# Patient Record
Sex: MALE | Race: WHITE | Employment: FULL TIME | URBAN - METROPOLITAN AREA
[De-identification: names, ages, dates, MRNs, and addresses within clinical notes are randomized per-mention and may not be internally consistent; named-entity substitution may affect disease eponyms.]

---

## 2024-11-18 ENCOUNTER — OFFICE VISIT (OUTPATIENT)
Dept: SURGERY | Age: 54
End: 2024-11-18
Payer: COMMERCIAL

## 2024-11-18 VITALS
DIASTOLIC BLOOD PRESSURE: 110 MMHG | SYSTOLIC BLOOD PRESSURE: 176 MMHG | HEART RATE: 94 BPM | BODY MASS INDEX: 44.1 KG/M2 | HEIGHT: 71 IN | WEIGHT: 315 LBS

## 2024-11-18 DIAGNOSIS — K59.1 FUNCTIONAL DIARRHEA: ICD-10-CM

## 2024-11-18 DIAGNOSIS — Z71.82 EXERCISE COUNSELING: ICD-10-CM

## 2024-11-18 DIAGNOSIS — E66.01 MORBID OBESITY: Primary | ICD-10-CM

## 2024-11-18 DIAGNOSIS — Z71.3 DIETARY COUNSELING: ICD-10-CM

## 2024-11-18 DIAGNOSIS — R11.0 NAUSEA: ICD-10-CM

## 2024-11-18 DIAGNOSIS — E66.01 OBESITY, MORBID, BMI 50 OR HIGHER: ICD-10-CM

## 2024-11-18 DIAGNOSIS — Z98.84 S/P LAPAROSCOPIC SLEEVE GASTRECTOMY: ICD-10-CM

## 2024-11-18 DIAGNOSIS — Z13.21 ENCOUNTER FOR VITAMIN DEFICIENCY SCREENING: ICD-10-CM

## 2024-11-18 DIAGNOSIS — K59.01 SLOW TRANSIT CONSTIPATION: ICD-10-CM

## 2024-11-18 PROCEDURE — 99203 OFFICE O/P NEW LOW 30 MIN: CPT | Performed by: SURGERY

## 2024-11-18 PROCEDURE — APPSS30 APP SPLIT SHARED TIME 16-30 MINUTES: Performed by: PHYSICIAN ASSISTANT

## 2024-11-18 RX ORDER — OXYBUTYNIN CHLORIDE 5 MG/1
5 TABLET, EXTENDED RELEASE ORAL DAILY
COMMUNITY

## 2024-11-18 NOTE — PROGRESS NOTES
[facility] NUTRITION REASSESSMENT  Assessment:   Almost 13 years post-op VSG. Pt consuming ~60g protein/d and ~90oz fluid/d. Pt is eating at least 3x/d. Pt is drinking water and gatorade zero. Pt is not exercising. Pt is taking MVI and Ca supplements as recommended.    Diet Recall:   Breakfast: Cereal   Lunch: Meat and cheese   Dinner: Meat and chips     Intervention:   Evaluated diet recall and identified modifications.  Encouraged pt to go \"back to basics: and focus on lean protein and mindful eating - provided pt with paperwork  Encouraged pt to begin exercising and increase as able        Monitoring and Evaluation:  Monitor for continued safe, supervised weight loss for VSG.   Will place pt on OM wait list  F/U per MD Neela Parkinson MBA. RD, LD   
medical history.  Patient Active Problem List   Diagnosis    PERLA on CPAP    Morbid obesity        PSH:    History reviewed. No pertinent surgical history.    MEDS:    Current Outpatient Medications   Medication Sig Dispense Refill    oxyBUTYnin (DITROPAN-XL) 5 MG extended release tablet Take 1 tablet by mouth daily       No current facility-administered medications for this visit.       ALLERGIES:      Not on File    SH:    Social History     Tobacco Use    Smoking status: Never    Smokeless tobacco: Never       FH:    History reviewed. No pertinent family history.    Review of systems:  The patient has no difficulty with chest pain or shortness of breath.  No fever or chills.  Comprehensive 13 point review of systems was otherwise unremarkable except as noted above.    Physical Exam:  BP (!) 176/110   Pulse 94   Ht 1.803 m (5' 11\")   Wt (!) 173.3 kg (382 lb)   BMI 53.28 kg/m²     General: Alert, oriented, cooperative white male in no acute distress.   Eyes: Sclera are clear. Conjunctiva and lids within normal limits. No icterus.  Ears and Nose: no gross deformities to visual inspection, gross hearing intact  Neck: Supple, trachea midline, no appreciable thyromegaly  Resp:  Breathing is  non-labored. Lungs clear to auscultation without wheezing or rhonchi   CV: RRR. No murmurs, rubs or gallops appreciated.  Abd: soft, not distended, active BS'S. No tenderness to palpation. Wounds are clean, dry and intact without signs of infection.     Discussed the patient's BMI with him.  The BMI follow up plan is as follows:    Plan:  He is doing well without any major concerns.  He will continue a protein first diet, all questions were answered.  He will increase the current exercise routine.  He will continue the appropriate vitamins and supplements.  Complete labs for monitoring.   Medical weight loss paperwork given.   Return to the office in 1 year or sooner.     Time: I spent 20 minutes preparing to see patient

## 2024-11-22 DIAGNOSIS — Z13.21 ENCOUNTER FOR VITAMIN DEFICIENCY SCREENING: ICD-10-CM

## 2024-11-22 DIAGNOSIS — E66.01 MORBID OBESITY: ICD-10-CM

## 2024-11-22 DIAGNOSIS — Z98.84 S/P LAPAROSCOPIC SLEEVE GASTRECTOMY: ICD-10-CM

## 2024-11-22 LAB
25(OH)D3 SERPL-MCNC: 30.4 NG/ML (ref 30–100)
ALBUMIN SERPL-MCNC: 3.5 G/DL (ref 3.5–5)
ALBUMIN/GLOB SERPL: 1 (ref 1–1.9)
ALP SERPL-CCNC: 79 U/L (ref 40–129)
ALT SERPL-CCNC: 21 U/L (ref 8–55)
ANION GAP SERPL CALC-SCNC: 11 MMOL/L (ref 7–16)
AST SERPL-CCNC: 22 U/L (ref 15–37)
BASOPHILS # BLD: 0.1 K/UL (ref 0–0.2)
BASOPHILS NFR BLD: 1 % (ref 0–2)
BILIRUB SERPL-MCNC: 0.3 MG/DL (ref 0–1.2)
BUN SERPL-MCNC: 21 MG/DL (ref 6–23)
CALCIUM SERPL-MCNC: 9.6 MG/DL (ref 8.8–10.2)
CHLORIDE SERPL-SCNC: 103 MMOL/L (ref 98–107)
CO2 SERPL-SCNC: 24 MMOL/L (ref 20–29)
CREAT SERPL-MCNC: 1.17 MG/DL (ref 0.8–1.3)
DIFFERENTIAL METHOD BLD: ABNORMAL
EOSINOPHIL # BLD: 0.1 K/UL (ref 0–0.8)
EOSINOPHIL NFR BLD: 1 % (ref 0.5–7.8)
ERYTHROCYTE [DISTWIDTH] IN BLOOD BY AUTOMATED COUNT: 15.7 % (ref 11.9–14.6)
FERRITIN SERPL-MCNC: 32 NG/ML (ref 8–388)
FOLATE SERPL-MCNC: 11.6 NG/ML (ref 3.1–17.5)
GLOBULIN SER CALC-MCNC: 3.5 G/DL (ref 2.3–3.5)
GLUCOSE SERPL-MCNC: 91 MG/DL (ref 70–99)
HCT VFR BLD AUTO: 47.2 % (ref 41.1–50.3)
HGB BLD-MCNC: 15.2 G/DL (ref 13.6–17.2)
IMM GRANULOCYTES # BLD AUTO: 0.1 K/UL (ref 0–0.5)
IMM GRANULOCYTES NFR BLD AUTO: 1 % (ref 0–5)
IRON SERPL-MCNC: 77 UG/DL (ref 35–100)
LYMPHOCYTES # BLD: 2.2 K/UL (ref 0.5–4.6)
LYMPHOCYTES NFR BLD: 27 % (ref 13–44)
MAGNESIUM SERPL-MCNC: 2.1 MG/DL (ref 1.8–2.4)
MCH RBC QN AUTO: 27.8 PG (ref 26.1–32.9)
MCHC RBC AUTO-ENTMCNC: 32.2 G/DL (ref 31.4–35)
MCV RBC AUTO: 86.4 FL (ref 82–102)
MONOCYTES # BLD: 0.6 K/UL (ref 0.1–1.3)
MONOCYTES NFR BLD: 8 % (ref 4–12)
NEUTS SEG # BLD: 5 K/UL (ref 1.7–8.2)
NEUTS SEG NFR BLD: 62 % (ref 43–78)
NRBC # BLD: 0 K/UL (ref 0–0.2)
PLATELET # BLD AUTO: 295 K/UL (ref 150–450)
PMV BLD AUTO: 9.8 FL (ref 9.4–12.3)
POTASSIUM SERPL-SCNC: 4.5 MMOL/L (ref 3.5–5.1)
PROT SERPL-MCNC: 6.9 G/DL (ref 6.3–8.2)
RBC # BLD AUTO: 5.46 M/UL (ref 4.23–5.6)
SODIUM SERPL-SCNC: 138 MMOL/L (ref 136–145)
TSH, 3RD GENERATION: 0.95 UIU/ML (ref 0.27–4.2)
VIT B12 SERPL-MCNC: 561 PG/ML (ref 193–986)
WBC # BLD AUTO: 7.9 K/UL (ref 4.3–11.1)

## 2024-11-26 LAB
VIT B1 BLD-SCNC: 131.1 NMOL/L (ref 66.5–200)
VIT B6 SERPL-MCNC: 45.7 UG/L (ref 3.4–65.2)

## 2024-11-27 LAB
NIACIN SERPL-MCNC: <5 NG/ML (ref 0–5)
NICOTINAMIDE SERPL-MCNC: 27.5 NG/ML (ref 5.2–72.1)

## 2025-03-31 ENCOUNTER — OFFICE VISIT (OUTPATIENT)
Dept: SURGERY | Age: 55
End: 2025-03-31
Payer: COMMERCIAL

## 2025-03-31 VITALS
HEIGHT: 71 IN | SYSTOLIC BLOOD PRESSURE: 168 MMHG | HEART RATE: 110 BPM | BODY MASS INDEX: 44.1 KG/M2 | DIASTOLIC BLOOD PRESSURE: 104 MMHG | WEIGHT: 315 LBS

## 2025-03-31 DIAGNOSIS — Z71.3 NUTRITIONAL COUNSELING: ICD-10-CM

## 2025-03-31 DIAGNOSIS — Z98.84 S/P LAPAROSCOPIC SLEEVE GASTRECTOMY: ICD-10-CM

## 2025-03-31 DIAGNOSIS — N32.81 OVERACTIVE BLADDER: ICD-10-CM

## 2025-03-31 DIAGNOSIS — E66.01 CLASS 3 SEVERE OBESITY DUE TO EXCESS CALORIES WITH SERIOUS COMORBIDITY AND BODY MASS INDEX (BMI) OF 50.0 TO 59.9 IN ADULT: ICD-10-CM

## 2025-03-31 DIAGNOSIS — E66.813 CLASS 3 SEVERE OBESITY DUE TO EXCESS CALORIES WITH SERIOUS COMORBIDITY AND BODY MASS INDEX (BMI) OF 50.0 TO 59.9 IN ADULT: ICD-10-CM

## 2025-03-31 DIAGNOSIS — G47.33 OSA (OBSTRUCTIVE SLEEP APNEA): Primary | ICD-10-CM

## 2025-03-31 DIAGNOSIS — Z71.82 EXERCISE COUNSELING: ICD-10-CM

## 2025-03-31 DIAGNOSIS — J45.20 MILD INTERMITTENT ASTHMA WITHOUT COMPLICATION: ICD-10-CM

## 2025-03-31 DIAGNOSIS — E65 ABDOMINAL OBESITY: ICD-10-CM

## 2025-03-31 PROBLEM — E55.9 VITAMIN D DEFICIENCY: Status: ACTIVE | Noted: 2017-01-10

## 2025-03-31 PROBLEM — R03.0 ELEVATED BLOOD-PRESSURE READING WITHOUT DIAGNOSIS OF HYPERTENSION: Status: ACTIVE | Noted: 2021-03-08

## 2025-03-31 PROBLEM — R73.01 IMPAIRED FASTING GLUCOSE: Status: ACTIVE | Noted: 2017-03-21

## 2025-03-31 PROCEDURE — 99215 OFFICE O/P EST HI 40 MIN: CPT | Performed by: PHYSICIAN ASSISTANT

## 2025-03-31 RX ORDER — TAMSULOSIN HYDROCHLORIDE 0.4 MG/1
CAPSULE ORAL
COMMUNITY
Start: 2025-02-03

## 2025-03-31 RX ORDER — ALBUTEROL SULFATE 90 UG/1
2 INHALANT RESPIRATORY (INHALATION) EVERY 4 HOURS PRN
COMMUNITY

## 2025-03-31 NOTE — PROGRESS NOTES
fever or chills.  Comprehensive 13 point review of systems was otherwise unremarkable except as noted above.    Physical Exam:     BP (!) 168/104   Pulse (!) 110   Ht 1.803 m (5' 11\")   Wt (!) 170.6 kg (376 lb)   BMI 52.44 kg/m²        Failed to redirect to the Timeline version of the [employer] SmartLink.    General:  Well-developed, well-nourished, no distress.  Psych:  Cooperative, good insight and judgement.  Neuro:  Alert, oriented to person, place and time.  HEENT:  Normocephalic, atraumatic. Sclera clear.  Lungs:  Unlabored breathing. Symmetrical chest expansion.   Chest wall:  No tenderness or deformity.  Heart:  Regular rate and rhythm. No JVD.  Abdomen:  Soft, non-tender, non-distended. No guarding or rebound.    Extremities:  Extremities normal, atraumatic, no cyanosis or edema.  Skin:  Skin color, texture, turgor normal. No rashes.    ASSESSMENT:  Morbid obesity with a Body mass index is 52.44 kg/m². beginning multi-disciplinary weight loss prep program.    PLAN:  We have discussed the patient's participation and reviewed the steps in our weight loss program. He has had a long history of failed diet and exercise programs and has good understanding about the risks, benefits, and commitment related to medical weight loss.    He is interested in proceeding with our program seeking the medical weight loss.       Diagnosis Orders   1. PERLA (obstructive sleep apnea)  Ambulatory Referral to Weight Loss    CBC with Auto Differential    Comprehensive Metabolic Panel    EKG 12 Lead    Hemoglobin A1C    Lipid Panel    TSH    Vitamin D 25 Hydroxy      2. Mild intermittent asthma without complication  Ambulatory Referral to Weight Loss    CBC with Auto Differential    Comprehensive Metabolic Panel    EKG 12 Lead    Hemoglobin A1C    Lipid Panel    TSH    Vitamin D 25 Hydroxy      3. Overactive bladder        4. Abdominal obesity        5. S/P laparoscopic sleeve gastrectomy        6. Class 3 severe obesity due to

## 2025-04-25 ENCOUNTER — CLINICAL DOCUMENTATION (OUTPATIENT)
Dept: CARDIAC REHAB | Age: 55
End: 2025-04-25

## 2025-04-25 NOTE — PROGRESS NOTES
Patient completed exercise assessment at [facility] this date.  He completed a 6min walk test and tolerated it well, with a slightly elevated BP.  We discussed exercise appropriate for lumbar health: something back support and seated. 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 Xenia Villa, exercise specialist

## 2025-05-01 ENCOUNTER — TELEMEDICINE (OUTPATIENT)
Dept: SURGERY | Age: 55
End: 2025-05-01

## 2025-05-01 DIAGNOSIS — Z71.3 NUTRITIONAL COUNSELING: Primary | ICD-10-CM

## 2025-05-01 NOTE — PROGRESS NOTES
Obesity Medicine Initial Nutrition Assessment     Assessment:    Initial Consult Date 03/31/25   Initial Height 5'11\"   Initial Weight 376lb        Estimated Nutrition Needs:  EEN for weight loss = MSJ x 1.3 - 500kcal/day = 2610kcal/day  Protein: 78-117g/day (1.0-1.5 gm/kg IBW)    Carbohydrate: 294g/day (45%)    Fat: 102g/day (35%)     Eating Habits:   Eating occasions/d: 3 x daily with some snacks   Main cook at home: Pt is main cook at home   Restaurant/Fast Food Intake: Rarely   Typical Beverage Consumption: Coffee, gatorade zero   Food Allergies: None   Cultural Preferences: None   Diet Recall:   Breakfast: Cereal  Lunch: Salami with cheese stick   Dinner: 8oz ribeye steak     Lifestyle Assessment:    Hours of sleep/night: ~6-7 hours    Current Occupation:    Activity during day: Sedentary    Number of people living in house and influence: Pt lives alone     Exercise Assessment:   PAR-Q: No contraindications to exercise   Medical:     Pain or injuries (present): Back pain     Past surgeries related to mobility: None   Exercise equipment at home: [employer]     Gym Membership: None   Current Exercise Routine: Some walking   Assessment Exercise Goal: Continue current exercise and increase as able    DIAGNOSIS:  Class III obesity R/T hx of yoyo dieting and excessive energy intake as evidenced by BMI = 52.44 and 219 % IBW.     Intervention:   Evaluated diet recall and identified modifications.  Educated pt on mindful eating techniques and macronutrients.  Encouraged continued and increased activity.          Monitoring and Evaluation:  Monitor for continued safe, supervised weight loss for OM   F/U per MD Neela Parkinson MBA. RD, LD

## 2025-05-22 NOTE — PROGRESS NOTES
Sandrine Sibley MD                 Comprehensive Medical & Surgical Weight Loss       Date of visit: 5/27/2025      History and Physical    Patient: Steve Liao MRN: [id number]     YOB: 1970  Age: 55 y.o.  Sex: male        Steve Liao  who presents to the [facility] for follow up after initial consultation to assist in weight loss.  We will review blood work results, medication options and discuss further our multi-disciplinary weight loss program.      He presents with a height of 1.803 m (5' 11\") and weight of (!) 173.3 kg (382 lb), giving him a Body mass index is 53.28 kg/m²..      Last Non-Surgical Weight Loss:      5/27/2025     1:21 PM 3/31/2025     1:46 PM   Non-Surgical Weight Loss Tracker   Consult Date 3/31/2025 3/31/2025   Initial Height 5' 11\" 5' 11\"   Initial Weight 376 lbs 376 lbs   Ideal Body Weight 179 lb 179 lb   Initial BMI 52.4 52.4   Initial  lb 197 lb   Non-Surgical Initial % Body Fat  51.8%    Initial Neck Circumference 18.5    Initial Waist Circumference 63.5    Initial Hip Circumference 67.5    Is patient taking anti-obesity Meds? No    Date 5/27/2025    Weight 382 lb    BMI 53.3    Weight Change since last Visit 6 lb    Weight Change since Initial Consult 6 lb    % EBWL -3%    % Total Body Weight Loss  -2%        Lowest weight 200 lbs  Highest weight 392 lbs  Biggest challenge to weight loss - dietary choices, limited exercise    MEDICAL HISTORY:  Morbid Obesity   Hx of sleeve gastrectomy - 2012  Hx of nicotine use - quit 2000    Comorbidity Yes or No   Hypertension No   Hyperlipidemia No   Diabetes Mellitus  Last A1c = 5.8 No   Coronary Artery Disease No   Gastroesophageal Reflux No   Obstructive Sleep Apnea Yes, on CPAP   Cancer No   Asthma Yes   Osteoarthritis No   Joint Pain Yes      Denies GERD symptoms including (-) heartburn, (-) regurgitation, (-) dysphagia.    Denies current medication regimen.  Denies previous testing including EGD.     Other Yes

## 2025-05-27 ENCOUNTER — OFFICE VISIT (OUTPATIENT)
Dept: SURGERY | Age: 55
End: 2025-05-27
Payer: COMMERCIAL

## 2025-05-27 VITALS
SYSTOLIC BLOOD PRESSURE: 167 MMHG | HEART RATE: 93 BPM | HEIGHT: 71 IN | DIASTOLIC BLOOD PRESSURE: 99 MMHG | WEIGHT: 315 LBS | BODY MASS INDEX: 44.1 KG/M2

## 2025-05-27 DIAGNOSIS — E66.813 CLASS 3 SEVERE OBESITY DUE TO EXCESS CALORIES WITH SERIOUS COMORBIDITY AND BODY MASS INDEX (BMI) OF 50.0 TO 59.9 IN ADULT (HCC): ICD-10-CM

## 2025-05-27 DIAGNOSIS — E66.01 MORBID OBESITY (HCC): ICD-10-CM

## 2025-05-27 DIAGNOSIS — Z98.84 S/P LAPAROSCOPIC SLEEVE GASTRECTOMY: ICD-10-CM

## 2025-05-27 DIAGNOSIS — J45.20 MILD INTERMITTENT ASTHMA WITHOUT COMPLICATION: ICD-10-CM

## 2025-05-27 DIAGNOSIS — Z71.3 DIETARY COUNSELING: ICD-10-CM

## 2025-05-27 DIAGNOSIS — E65 ABDOMINAL OBESITY: ICD-10-CM

## 2025-05-27 DIAGNOSIS — Z71.82 EXERCISE COUNSELING: ICD-10-CM

## 2025-05-27 DIAGNOSIS — Z71.3 NUTRITIONAL COUNSELING: ICD-10-CM

## 2025-05-27 DIAGNOSIS — G47.33 OSA (OBSTRUCTIVE SLEEP APNEA): Primary | ICD-10-CM

## 2025-05-27 DIAGNOSIS — N32.81 OVERACTIVE BLADDER: ICD-10-CM

## 2025-05-27 PROCEDURE — 99215 OFFICE O/P EST HI 40 MIN: CPT | Performed by: SURGERY

## 2025-05-27 RX ORDER — TIRZEPATIDE 2.5 MG/.5ML
2.5 INJECTION, SOLUTION SUBCUTANEOUS WEEKLY
Qty: 2 ML | Refills: 0 | Status: SHIPPED | OUTPATIENT
Start: 2025-05-27 | End: 2025-06-26

## 2025-06-24 ENCOUNTER — OFFICE VISIT (OUTPATIENT)
Dept: SURGERY | Age: 55
End: 2025-06-24
Payer: COMMERCIAL

## 2025-06-24 VITALS
DIASTOLIC BLOOD PRESSURE: 98 MMHG | SYSTOLIC BLOOD PRESSURE: 155 MMHG | BODY MASS INDEX: 44.1 KG/M2 | HEIGHT: 71 IN | WEIGHT: 315 LBS

## 2025-06-24 DIAGNOSIS — Z98.84 S/P LAPAROSCOPIC SLEEVE GASTRECTOMY: ICD-10-CM

## 2025-06-24 DIAGNOSIS — G47.33 OSA (OBSTRUCTIVE SLEEP APNEA): Primary | ICD-10-CM

## 2025-06-24 DIAGNOSIS — E66.813 CLASS 3 SEVERE OBESITY DUE TO EXCESS CALORIES WITH SERIOUS COMORBIDITY AND BODY MASS INDEX (BMI) OF 50.0 TO 59.9 IN ADULT (HCC): ICD-10-CM

## 2025-06-24 DIAGNOSIS — E65 ABDOMINAL OBESITY: ICD-10-CM

## 2025-06-24 DIAGNOSIS — Z71.82 EXERCISE COUNSELING: ICD-10-CM

## 2025-06-24 DIAGNOSIS — J45.20 MILD INTERMITTENT ASTHMA WITHOUT COMPLICATION: ICD-10-CM

## 2025-06-24 DIAGNOSIS — Z71.3 DIETARY COUNSELING: ICD-10-CM

## 2025-06-24 DIAGNOSIS — N32.81 OVERACTIVE BLADDER: ICD-10-CM

## 2025-06-24 PROCEDURE — 99215 OFFICE O/P EST HI 40 MIN: CPT | Performed by: SURGERY

## 2025-06-24 NOTE — PROGRESS NOTES
[facility] NUTRITION REASSESSMENT     Assessment:    Pt reports good dietary compliance since last office visit. Pt is eating at least 3x/d. Pt is drinking water and gatorade zero. Pt is exercising via walking. Pt reports diarrhea  Diet Recall:              Breakfast: Hard boiled eggs              Lunch: Meat and Cheese              Dinner: Ribeye steak - trimmed     Intervention:   Evaluated diet recall   Encouraged continued and increased activity.          Monitoring and Evaluation:  Monitor for continued safe, supervised weight loss for OM.    F/U per MD Neela Parkinson MBA. RD, LD    
18.5 18.5    Initial Waist Circumference 63.5 63.5    Initial Hip Circumference 67.5 67.5    Is patient taking anti-obesity Meds? No No    Date 6/24/2025 5/27/2025    Weight 370 lb 382 lb    BMI 51.6 53.3    Weight Change since last Visit -12 lb 6 lb    Weight Change since Initial Consult -6 lb 6 lb    % EBWL 3% -3%    % Total Body Weight Loss  2% -2%    Subsequent Neck Circumference(inches) 18.5     Subsequent Waist Circumference(inches) 63.5     Subsequent Hip Circumfrence (Inches) 67.5 in     Subseq. Anti-obesity Meds? Yes     Subseq. Anti-obesity Meds metformin     General Comments 5/28/2025              Evaluation of Co-morbid Conditions  Sleep Apnea Yes, uses CPAP- Yes   Diabetes No, insulin dependent- No   Hypertension No, number of medications- 0   GERD No   Hyperlipidemia No        MEDICATIONS:  Current Outpatient Medications   Medication Sig Dispense Refill    tirzepatide-weight management (ZEPBOUND) 2.5 MG/0.5ML SOLN subCUTAneous injection (VIAL) Inject 0.5 mLs into the skin once a week 2 mL 0    metFORMIN (GLUCOPHAGE) 500 MG tablet Take 1 tablet by mouth 2 times daily (with meals) 180 tablet 0    metFORMIN (GLUCOPHAGE) 500 MG tablet Take 1 tablet by mouth daily (with breakfast) for 15 days, THEN 1 tablet 2 times daily (with meals). 75 tablet 0    tirzepatide-weight management (ZEPBOUND) 2.5 MG/0.5ML SOAJ subCUTAneous auto-injector pen Inject 2.5 mg into the skin once a week 2 mL 0    albuterol sulfate HFA (PROVENTIL;VENTOLIN;PROAIR) 108 (90 Base) MCG/ACT inhaler Take 2 puffs by mouth every 4 hours as needed      tamsulosin (FLOMAX) 0.4 MG capsule       oxyBUTYnin (DITROPAN-XL) 5 MG extended release tablet Take 1 tablet by mouth daily       No current facility-administered medications for this visit.        ALLERGIES:  Allergies   Allergen Reactions    Aspirin Rash    Penicillins Rash       ROS: The patient has no difficulty with chest pain or shortness of breath.  No fever or chills.  Comprehensive 13

## 2025-07-22 ENCOUNTER — OFFICE VISIT (OUTPATIENT)
Dept: SURGERY | Age: 55
End: 2025-07-22
Payer: COMMERCIAL

## 2025-07-22 VITALS — HEIGHT: 71 IN | BODY MASS INDEX: 44.1 KG/M2 | WEIGHT: 315 LBS

## 2025-07-22 DIAGNOSIS — E66.813 CLASS 3 SEVERE OBESITY DUE TO EXCESS CALORIES WITH SERIOUS COMORBIDITY AND BODY MASS INDEX (BMI) OF 50.0 TO 59.9 IN ADULT (HCC): ICD-10-CM

## 2025-07-22 DIAGNOSIS — Z98.84 S/P LAPAROSCOPIC SLEEVE GASTRECTOMY: ICD-10-CM

## 2025-07-22 DIAGNOSIS — E65 ABDOMINAL OBESITY: ICD-10-CM

## 2025-07-22 DIAGNOSIS — J45.20 MILD INTERMITTENT ASTHMA WITHOUT COMPLICATION: ICD-10-CM

## 2025-07-22 DIAGNOSIS — G47.33 OSA (OBSTRUCTIVE SLEEP APNEA): Primary | ICD-10-CM

## 2025-07-22 DIAGNOSIS — Z71.82 EXERCISE COUNSELING: ICD-10-CM

## 2025-07-22 DIAGNOSIS — N32.81 OVERACTIVE BLADDER: ICD-10-CM

## 2025-07-22 DIAGNOSIS — Z71.3 DIETARY COUNSELING: ICD-10-CM

## 2025-07-22 PROCEDURE — 99215 OFFICE O/P EST HI 40 MIN: CPT | Performed by: SURGERY

## 2025-07-22 NOTE — PROGRESS NOTES
[facility] NUTRITION REASSESSMENT     Assessment:    Pt reports working on dietary compliance since last office visit. Pt is eating at least 3x/d. Pt is drinking coffee, gatorade zero. Pt is exercising via walking and stationary bike.   Diet Recall:              Breakfast: Hard boiled eggs              Lunch: Meat and cheese              Dinner: Bryce butt and water melon      Intervention:   Evaluated diet recall   Encouraged continued activity.          Monitoring and Evaluation:  Monitor for continued safe, supervised weight loss for OM.    F/U per MD Neela Parkinson MBA. RD, LD

## 2025-07-22 NOTE — PROGRESS NOTES
Sandrine Sibley MD   Bariatric & [facility]  [address], [address]  [place] [address]                                                     Office [phone] Fax [phone]        Date of visit: 7/22/2025      History and Physical    Patient: Steve Liao MRN: [id number]     YOB: 1970  Age: 55 y.o.  Sex: male              PCP: None, None   Pharmacy:   [id number] [place], [place] - [address] P [phone] - F [phone]  [address] [address]  Phone: [phone] Fax: [phone]    [facility] [id number] [place] [address] Rd. - P [phone] - F [phone]  [address] [place] [address]  Phone: [phone] Fax: [phone]     Date:  7/22/2025        2 month(s) post medication start  He has lost,  9 lbs. since his last office visit.    How is patient taking medication? Metformin 500 mg BID, Zepbound 2.5 mg weekly VIAL    He has been doing well. He reports having less appetite. He also reports early satiety.    Clinical Assessment and Physical Exam:    he is doing very well today and is feeling good. He reports that he has been adhering to the protein first diet without difficulty.  He denies any tachycardia, abdominal pain, nausea or vomiting.  He does report having problems with constipation.     Protein:  80 grams per day  Fluids:    60-90 ounces per day  Physical Activity:  walking and stationary bike daily    Last Non-Surgical Weight Loss:      7/21/2025     3:43 PM 6/24/2025     8:49 AM 5/27/2025     1:21 PM 3/31/2025     1:46 PM   Non-Surgical Weight Loss Tracker   Consult Date 1970 3/31/2025 3/31/2025 3/31/2025   Initial Height 5' 11\" 5' 11\" 5' 11\" 5' 11\"   Initial Weight 376 lbs 376 lbs 376 lbs 376 lbs   Ideal Body Weight 179 lb 179 lb 179 lb 179 lb   Initial BMI 52.4 52.4 52.4 52.4   Initial

## 2025-08-21 ENCOUNTER — OFFICE VISIT (OUTPATIENT)
Dept: SURGERY | Age: 55
End: 2025-08-21
Payer: COMMERCIAL

## 2025-08-21 VITALS
WEIGHT: 315 LBS | HEIGHT: 71 IN | SYSTOLIC BLOOD PRESSURE: 135 MMHG | DIASTOLIC BLOOD PRESSURE: 85 MMHG | HEART RATE: 86 BPM | BODY MASS INDEX: 44.1 KG/M2

## 2025-08-21 DIAGNOSIS — J45.20 MILD INTERMITTENT ASTHMA WITHOUT COMPLICATION: ICD-10-CM

## 2025-08-21 DIAGNOSIS — E65 ABDOMINAL OBESITY: ICD-10-CM

## 2025-08-21 DIAGNOSIS — Z71.82 EXERCISE COUNSELING: ICD-10-CM

## 2025-08-21 DIAGNOSIS — Z71.3 NUTRITIONAL COUNSELING: ICD-10-CM

## 2025-08-21 DIAGNOSIS — G47.33 OSA (OBSTRUCTIVE SLEEP APNEA): Primary | ICD-10-CM

## 2025-08-21 DIAGNOSIS — Z98.84 S/P LAPAROSCOPIC SLEEVE GASTRECTOMY: ICD-10-CM

## 2025-08-21 DIAGNOSIS — N32.81 OVERACTIVE BLADDER: ICD-10-CM

## 2025-08-21 DIAGNOSIS — E66.813 CLASS 3 SEVERE OBESITY DUE TO EXCESS CALORIES WITH SERIOUS COMORBIDITY AND BODY MASS INDEX (BMI) OF 45.0 TO 49.9 IN ADULT (HCC): ICD-10-CM

## 2025-08-21 PROCEDURE — 99215 OFFICE O/P EST HI 40 MIN: CPT | Performed by: PHYSICIAN ASSISTANT
